# Patient Record
Sex: MALE | Race: WHITE | Employment: STUDENT | URBAN - METROPOLITAN AREA
[De-identification: names, ages, dates, MRNs, and addresses within clinical notes are randomized per-mention and may not be internally consistent; named-entity substitution may affect disease eponyms.]

---

## 2017-11-27 ENCOUNTER — OFFICE VISIT (OUTPATIENT)
Dept: SURGERY | Facility: CLINIC | Age: 27
End: 2017-11-27

## 2017-11-27 VITALS
HEIGHT: 78 IN | SYSTOLIC BLOOD PRESSURE: 122 MMHG | WEIGHT: 238 LBS | BODY MASS INDEX: 27.54 KG/M2 | DIASTOLIC BLOOD PRESSURE: 85 MMHG | TEMPERATURE: 98 F | HEART RATE: 74 BPM

## 2017-11-27 DIAGNOSIS — R63.0 APPETITE IMPAIRED: ICD-10-CM

## 2017-11-27 DIAGNOSIS — R11.14 BILIOUS VOMITING WITH NAUSEA: ICD-10-CM

## 2017-11-27 DIAGNOSIS — R10.13 EPIGASTRIC PAIN: Primary | ICD-10-CM

## 2017-11-27 DIAGNOSIS — R68.81 EARLY SATIETY: ICD-10-CM

## 2017-11-27 PROCEDURE — 99204 OFFICE O/P NEW MOD 45 MIN: CPT | Performed by: COLON & RECTAL SURGERY

## 2017-11-28 NOTE — PATIENT INSTRUCTIONS
This patient presents with early satiety, nausea, vomiting, and some epigastric abdominal pain. This patient's father went through a prolonged significant illness and recently passed away. For the last 6 weeks, this patient has had trouble eating.   I evidence of ascites. There are no incisions on the abdomen. Bowel sounds have normal activity and normal pitch. The remaining systemic exam is completely normal as well. This patient will require EGD.   He needs a thorough evaluation of the esophagu

## 2017-11-28 NOTE — H&P
New Patient Visit Note       Active Problems      1. Epigastric pain    2. Early satiety    3. Bilious vomiting with nausea    4. Appetite impaired        Chief Complaint   Patient presents with:  Abdominal Pain: New patient here for abdominal pain.  No melinda drooling of saliva. He has no heartburn type symptoms or reflux symptoms. My total face time with this patient was 45 minutes. Greater than half of our visit was spent in counseling the patient on the above listed diagnoses and treatment options. syncope and weakness. Hematological: Negative for adenopathy. Does not bruise/bleed easily. Psychiatric/Behavioral: Negative for behavioral problems and sleep disturbance.        Physical Findings   /85   Pulse 74   Temp 98.1 °F (36.7 °C)   Ht 81\ Lymphadenopathy:     He has no cervical adenopathy. Right cervical: No superficial cervical and no deep cervical adenopathy present. Left cervical: No superficial cervical and no deep cervical adenopathy present.         Right: No inguinal an has not changed since his father's death. He states the reason he waited 6-8 weeks was because he was personally concerned about this being a stress related illness. The patient does not have dysphagia. He has no regurgitation of non-digested food.   H

## 2017-12-01 ENCOUNTER — HOSPITAL ENCOUNTER (OUTPATIENT)
Dept: CT IMAGING | Age: 27
Discharge: HOME | End: 2017-12-01
Attending: COLON & RECTAL SURGERY
Payer: COMMERCIAL

## 2017-12-01 DIAGNOSIS — R10.13 EPIGASTRIC PAIN: ICD-10-CM

## 2017-12-01 DIAGNOSIS — R10.13 ABDOMINAL PAIN, EPIGASTRIC: ICD-10-CM

## 2017-12-01 PROCEDURE — 74177 CT ABD & PELVIS W/CONTRAST: CPT | Performed by: COLON & RECTAL SURGERY

## 2017-12-04 ENCOUNTER — TELEPHONE (OUTPATIENT)
Dept: SURGERY | Facility: CLINIC | Age: 27
End: 2017-12-04

## 2017-12-04 NOTE — TELEPHONE ENCOUNTER
Patient's mother called to say patient cancelled his EGD and left a message on Surgery Scheduler's mailbox, message left for [facility] [place], Dr. Sara Hancock informed.

## 2017-12-04 NOTE — TELEPHONE ENCOUNTER
Pt called. Pt saw Dr. Radha Ty 11/27/17. Had CT abd/pelvis w/ 12/1/17. Has a couple of questions.  Has an EGD tomorrow.' mp  Pt [phone]